# Patient Record
Sex: MALE | Race: WHITE | Employment: FULL TIME | URBAN - NONMETROPOLITAN AREA
[De-identification: names, ages, dates, MRNs, and addresses within clinical notes are randomized per-mention and may not be internally consistent; named-entity substitution may affect disease eponyms.]

---

## 2018-09-24 ENCOUNTER — HOSPITAL ENCOUNTER (OUTPATIENT)
Dept: ULTRASOUND IMAGING | Facility: HOSPITAL | Age: 38
Discharge: HOME | End: 2018-09-24
Attending: EMERGENCY MEDICINE | Admitting: EMERGENCY MEDICINE
Payer: COMMERCIAL

## 2018-09-24 DIAGNOSIS — R31.9 HEMATURIA: ICD-10-CM

## 2018-09-24 PROCEDURE — 76770 US EXAM ABDO BACK WALL COMP: CPT | Mod: TC

## 2019-04-18 ENCOUNTER — TRANSFERRED RECORDS (OUTPATIENT)
Dept: HEALTH INFORMATION MANAGEMENT | Facility: OTHER | Age: 39
End: 2019-04-18

## 2019-04-22 ENCOUNTER — TRANSFERRED RECORDS (OUTPATIENT)
Dept: HEALTH INFORMATION MANAGEMENT | Facility: OTHER | Age: 39
End: 2019-04-22

## 2019-04-24 ENCOUNTER — OFFICE VISIT (OUTPATIENT)
Dept: UROLOGY | Facility: OTHER | Age: 39
End: 2019-04-24
Attending: UROLOGY
Payer: COMMERCIAL

## 2019-04-24 VITALS
TEMPERATURE: 96.8 F | SYSTOLIC BLOOD PRESSURE: 122 MMHG | RESPIRATION RATE: 16 BRPM | OXYGEN SATURATION: 95 % | HEART RATE: 68 BPM | WEIGHT: 245 LBS | BODY MASS INDEX: 31.44 KG/M2 | HEIGHT: 74 IN | DIASTOLIC BLOOD PRESSURE: 64 MMHG

## 2019-04-24 DIAGNOSIS — N20.0 KIDNEY STONES: Primary | ICD-10-CM

## 2019-04-24 PROBLEM — I10 ESSENTIAL HYPERTENSION: Status: ACTIVE | Noted: 2018-06-29

## 2019-04-24 PROBLEM — E66.811 OBESITY (BMI 30.0-34.9): Status: ACTIVE | Noted: 2018-06-28

## 2019-04-24 PROCEDURE — 99204 OFFICE O/P NEW MOD 45 MIN: CPT | Performed by: UROLOGY

## 2019-04-24 RX ORDER — HYDROCODONE BITARTRATE AND ACETAMINOPHEN 5; 325 MG/1; MG/1
TABLET ORAL
Qty: 30 TABLET | Refills: 0 | Status: ON HOLD | OUTPATIENT
Start: 2019-04-24 | End: 2019-05-07

## 2019-04-24 RX ORDER — HYDROCODONE BITARTRATE AND ACETAMINOPHEN 5; 325 MG/1; MG/1
TABLET ORAL EVERY 4 HOURS PRN
Refills: 0 | Status: ON HOLD | COMMUNITY
Start: 2019-04-22 | End: 2019-05-07

## 2019-04-24 RX ORDER — LISINOPRIL 20 MG/1
TABLET ORAL DAILY
Refills: 3 | COMMUNITY
Start: 2019-04-22

## 2019-04-24 ASSESSMENT — MIFFLIN-ST. JEOR: SCORE: 2101.06

## 2019-04-24 ASSESSMENT — PAIN SCALES - GENERAL: PAINLEVEL: MODERATE PAIN (4)

## 2019-04-24 NOTE — PROGRESS NOTES
Type of Visit  NPV    Chief Complaint  Kidney stones    HPI  Mr. Calderón is a 39 year old male who presents with right UPJ stone and left kidney stones  The patient initially presented to the ED 6 days ago in [place].  At that time the patient underwent a CT scan which revealed a 11 mm obstructing stone.  The patient currently denies fevers or chills.  The patient currently denies nausea or vomiting.  The patient has not undergone surgery in the past for stones.    The patient was seen about 7 months ago for hematuria and underwent an ultrasound.  Stones were identified but they were not obstructive and painful.  He has not seen a urologist in the meantime.  Norco approximately 4 tablets a day has been managing his pain moderately well.  He has not been taking NSAIDs.    Pain ROS  Location:  Right flank  Quality:    Sharp and Dull  Provocative factors:  Nothing makes it worse  Palliative factors:   Narcotics make it better  Radiation:   Along right flank  Severity:   3/10 currently and 7-8/10 at its worst  Time:     Pain started 6 day(s) ago    For this visit I reviewed outside records including imaging and report.      Past Medical History  He  has no past medical history on file.  Patient Active Problem List   Diagnosis     Essential hypertension     Obesity (BMI 30.0-34.9)       Past Surgical History  He  has no past surgical history on file.    Medications  He has a current medication list which includes the following prescription(s): hydrocodone-acetaminophen and lisinopril.    Allergies  No Known Allergies    Social History  He  reports that he has been smoking.  He has never used smokeless tobacco. He reports that he drinks alcohol. He reports that he has current or past drug history.  No drug abuse.    Family History  History reviewed. No pertinent family history.    Review of Systems  I personally reviewed the ROS with the patient.    Nursing Notes:   Michelle Yap LPN  4/24/2019  1:13 PM  Signed  Chief  "Complaint   Patient presents with     Consult     [facility]       Initial /64   Pulse 68   Temp 96.8  F (36  C) (Tympanic)   Resp 16   Ht 1.88 m (6' 2\")   Wt 111.1 kg (245 lb)   SpO2 95%   BMI 31.46 kg/m    Estimated body mass index is 31.46 kg/m  as calculated from the following:    Height as of this encounter: 1.88 m (6' 2\").    Weight as of this encounter: 111.1 kg (245 lb).  Medication Reconciliation: complete   Review of Systems:    Weight loss:    No     Recent fever/chills:  yes   Night sweats:   No  Current skin rash:  No   Recent hair loss:  No  Heat intolerance:  No   Cold intolerance:  No  Chest pain:   No   Palpitations:   No  Shortness of breath:  No   Wheezing:   No  Constipation:    yes   Diarrhea:   No   Nausea:   yes   Vomiting:   yes   Kidney/side pain:  yes   Back pain:   No  Frequent headaches:  No   Dizziness:     No  Leg swelling:   No   Calf pain:    No    Parents, brothers or sisters with history of kidney cancer:   No  Parents, brothers or sisters with history of bladder cancer: No      Michelle Yap LPN      Physical Exam  Vitals:    04/24/19 1251   BP: 122/64   Pulse: 68   Resp: 16   Temp: 96.8  F (36  C)   TempSrc: Tympanic   SpO2: 95%   Weight: 111.1 kg (245 lb)   Height: 1.88 m (6' 2\")     Constitutional: No acute distress.  Alert and cooperative   Head: NCAT  Eyes: Conjunctivae normal  Cardiovascular: Regular rate and rhythm  Pulmonary/Chest: Respirations are even and non-labored bilaterally, no audible wheezing CTAB  Abdominal: Soft. No distension, tenderness, masses or guarding.   Neurological: A + O x 3.  Cranial Nerves II-XII grossly intact.  Extremities: ELMER x 4, Warm. No clubbing.  No cyanosis.    Skin: Pink, warm and dry.  No visible rashes noted.  Psychiatric:  Normal mood and affect  Back:  - left CVA tenderness.  + right CVA tenderness.  Genitourinary: nonpalpable bladder    Labs  Results for orders placed or performed during the " hospital encounter of 09/24/18   US Renal Complete    Narrative    PROCEDURE: US RENAL COMPLETE    HISTORY: Hematuria    TECHNIQUE:  A renal ultrasound was performed.    COMPARISON:  None.    MEASUREMENTS:    Right renal length: 12.7 cm  Left renal length: 13.6 cm    RENAL FINDINGS: The kidneys are normal in size and echogenicity. A 9  mm cyst is present on the right. An 8 mm shadowing stone is present in  the mid left kidney. Another 1.3 cm shadowing stone is present in the  slightly upper left kidney. A few dilated calyces are questioned in  the upper pole the left kidney.    BLADDER: The bladder is well distended and unremarkable.      Impression    IMPRESSION:      At least 2 left renal stones measuring up to 13 mm. Questionable  dilation of the upper pole calyces of the left kidney. Consider CT  urogram WWO for further assessment.      ERAN BAIRES MD       Imaging  CT stone study   4/18/2019  I personally reviewed and interpreted the images and report.  Bilateral kidney stones  Right sided obstructing 11 x 13  Left side nonobstructing 15mm    Assessment  Mr. Calderón is a 39 year old male who presents with symptomatic right  ureteral stone.    Discussed the treatment options for the right stone including ESWL and ureteroscopy with laser lithotripsy.  Stone is too big to pass.    After explaining the risks, benefits, and alternatives a decision was made to proceed with ureteroscopy/laser lithotripsy.    The patient was explained the specific risks of bleeding, pain, infection, and ureteral injury.    In addition, the patient was told a stent may need to be placed which could result in urinary frequency, urgency, dysuria, and flank pain with voiding.    It would require an office based procedure to remove it at a later date.    Finally, the patient was told if the stone was very impacted, that we would place a stent and return at a later date to treat the stone.      Plan  Norco #30  I also recommended he  "start taking ibuprofen 800 mg 3 times a day.  The patient was scheduled and consented for \"right ureteroscopy with holmium laser lithotripsy and stent placement\" in the OR (LMA general anesthesia).  Plan for [facility] after treatment       "

## 2019-04-24 NOTE — NURSING NOTE
"Chief Complaint   Patient presents with     Consult     [facility]       Initial /64   Pulse 68   Temp 96.8  F (36  C) (Tympanic)   Resp 16   Ht 1.88 m (6' 2\")   Wt 111.1 kg (245 lb)   SpO2 95%   BMI 31.46 kg/m   Estimated body mass index is 31.46 kg/m  as calculated from the following:    Height as of this encounter: 1.88 m (6' 2\").    Weight as of this encounter: 111.1 kg (245 lb).  Medication Reconciliation: complete   Review of Systems:    Weight loss:    No     Recent fever/chills:  yes   Night sweats:   No  Current skin rash:  No   Recent hair loss:  No  Heat intolerance:  No   Cold intolerance:  No  Chest pain:   No   Palpitations:   No  Shortness of breath:  No   Wheezing:   No  Constipation:    yes   Diarrhea:   No   Nausea:   yes   Vomiting:   yes   Kidney/side pain:  yes   Back pain:   No  Frequent headaches:  No   Dizziness:     No  Leg swelling:   No   Calf pain:    No    Parents, brothers or sisters with history of kidney cancer:   No  Parents, brothers or sisters with history of bladder cancer: No      Michelle Yap LPN    "

## 2019-04-30 ENCOUNTER — ANESTHESIA (OUTPATIENT)
Dept: SURGERY | Facility: OTHER | Age: 39
End: 2019-04-30
Payer: COMMERCIAL

## 2019-04-30 ENCOUNTER — HOSPITAL ENCOUNTER (OUTPATIENT)
Dept: GENERAL RADIOLOGY | Facility: OTHER | Age: 39
End: 2019-04-30
Attending: UROLOGY | Admitting: UROLOGY
Payer: COMMERCIAL

## 2019-04-30 ENCOUNTER — ANESTHESIA EVENT (OUTPATIENT)
Dept: SURGERY | Facility: OTHER | Age: 39
End: 2019-04-30
Payer: COMMERCIAL

## 2019-04-30 ENCOUNTER — HOSPITAL ENCOUNTER (OUTPATIENT)
Facility: OTHER | Age: 39
Discharge: HOME | End: 2019-04-30
Attending: UROLOGY | Admitting: UROLOGY
Payer: COMMERCIAL

## 2019-04-30 VITALS
DIASTOLIC BLOOD PRESSURE: 87 MMHG | SYSTOLIC BLOOD PRESSURE: 119 MMHG | TEMPERATURE: 97.4 F | HEART RATE: 62 BPM | OXYGEN SATURATION: 96 % | RESPIRATION RATE: 12 BRPM

## 2019-04-30 DIAGNOSIS — N20.0 BILATERAL KIDNEY STONES: Primary | ICD-10-CM

## 2019-04-30 DIAGNOSIS — N20.0 KIDNEY STONE ON RIGHT SIDE: ICD-10-CM

## 2019-04-30 PROCEDURE — 25800030 ZZH RX IP 258 OP 636: Performed by: NURSE ANESTHETIST, CERTIFIED REGISTERED

## 2019-04-30 PROCEDURE — 27210794 ZZH OR GENERAL SUPPLY STERILE: Performed by: UROLOGY

## 2019-04-30 PROCEDURE — C1758 CATHETER, URETERAL: HCPCS | Performed by: UROLOGY

## 2019-04-30 PROCEDURE — 40000306 ZZH STATISTIC PRE PROC ASSESS II: Performed by: UROLOGY

## 2019-04-30 PROCEDURE — 27211024 ZZHC OR SUPPLY OTHER OPNP: Performed by: UROLOGY

## 2019-04-30 PROCEDURE — 25500064 ZZH RX 255 OP 636: Performed by: UROLOGY

## 2019-04-30 PROCEDURE — 25000128 H RX IP 250 OP 636: Performed by: NURSE ANESTHETIST, CERTIFIED REGISTERED

## 2019-04-30 PROCEDURE — 71000027 ZZH RECOVERY PHASE 2 EACH 15 MINS: Performed by: UROLOGY

## 2019-04-30 PROCEDURE — 71000014 ZZH RECOVERY PHASE 1 LEVEL 2 FIRST HR: Performed by: UROLOGY

## 2019-04-30 PROCEDURE — 25800025 ZZH RX 258: Performed by: UROLOGY

## 2019-04-30 PROCEDURE — C1769 GUIDE WIRE: HCPCS | Performed by: UROLOGY

## 2019-04-30 PROCEDURE — 37000008 ZZH ANESTHESIA TECHNICAL FEE, 1ST 30 MIN: Performed by: UROLOGY

## 2019-04-30 PROCEDURE — 74420 UROGRAPHY RTRGR +-KUB: CPT | Mod: 26 | Performed by: UROLOGY

## 2019-04-30 PROCEDURE — C1894 INTRO/SHEATH, NON-LASER: HCPCS | Performed by: UROLOGY

## 2019-04-30 PROCEDURE — C2617 STENT, NON-COR, TEM W/O DEL: HCPCS | Performed by: UROLOGY

## 2019-04-30 PROCEDURE — 74420 UROGRAPHY RTRGR +-KUB: CPT

## 2019-04-30 PROCEDURE — 25000125 ZZHC RX 250: Performed by: NURSE ANESTHETIST, CERTIFIED REGISTERED

## 2019-04-30 PROCEDURE — 52356 CYSTO/URETERO W/LITHOTRIPSY: CPT | Performed by: NURSE ANESTHETIST, CERTIFIED REGISTERED

## 2019-04-30 PROCEDURE — 52356 CYSTO/URETERO W/LITHOTRIPSY: CPT | Mod: 50 | Performed by: UROLOGY

## 2019-04-30 PROCEDURE — 36000058 ZZH SURGERY LEVEL 3 EA 15 ADDTL MIN: Performed by: UROLOGY

## 2019-04-30 PROCEDURE — 82365 CALCULUS SPECTROSCOPY: CPT | Performed by: UROLOGY

## 2019-04-30 PROCEDURE — 37000009 ZZH ANESTHESIA TECHNICAL FEE, EACH ADDTL 15 MIN: Performed by: UROLOGY

## 2019-04-30 PROCEDURE — 36000060 ZZH SURGERY LEVEL 3 W FLUORO 1ST 30 MIN: Performed by: UROLOGY

## 2019-04-30 PROCEDURE — 40000278 XR SURGERY CARM FLUORO LESS THAN 5 MIN

## 2019-04-30 PROCEDURE — 76499 UNLISTED DX RADIOGRAPHIC PX: CPT

## 2019-04-30 PROCEDURE — 25000128 H RX IP 250 OP 636: Performed by: UROLOGY

## 2019-04-30 DEVICE — STENT URETERAL CONTOUR SOFT PERCUFLEX 6FRX28CM
Type: IMPLANTABLE DEVICE | Site: URETER | Status: NON-FUNCTIONAL
Removed: 2019-05-07

## 2019-04-30 RX ORDER — OXYCODONE AND ACETAMINOPHEN 5; 325 MG/1; MG/1
1-2 TABLET ORAL EVERY 6 HOURS PRN
Qty: 30 TABLET | Refills: 0 | Status: ON HOLD | OUTPATIENT
Start: 2019-04-30 | End: 2019-05-07

## 2019-04-30 RX ORDER — HYDROMORPHONE HYDROCHLORIDE 1 MG/ML
.3-.5 INJECTION, SOLUTION INTRAMUSCULAR; INTRAVENOUS; SUBCUTANEOUS EVERY 10 MIN PRN
Status: DISCONTINUED | OUTPATIENT
Start: 2019-04-30 | End: 2019-04-30 | Stop reason: HOSPADM

## 2019-04-30 RX ORDER — FENTANYL CITRATE 50 UG/ML
25 INJECTION, SOLUTION INTRAMUSCULAR; INTRAVENOUS
Status: DISCONTINUED | OUTPATIENT
Start: 2019-04-30 | End: 2019-04-30 | Stop reason: HOSPADM

## 2019-04-30 RX ORDER — FENTANYL CITRATE 50 UG/ML
25-50 INJECTION, SOLUTION INTRAMUSCULAR; INTRAVENOUS
Status: DISCONTINUED | OUTPATIENT
Start: 2019-04-30 | End: 2019-04-30 | Stop reason: HOSPADM

## 2019-04-30 RX ORDER — PROPOFOL 10 MG/ML
INJECTION, EMULSION INTRAVENOUS PRN
Status: DISCONTINUED | OUTPATIENT
Start: 2019-04-30 | End: 2019-04-30

## 2019-04-30 RX ORDER — OXYCODONE AND ACETAMINOPHEN 5; 325 MG/1; MG/1
1-2 TABLET ORAL EVERY 4 HOURS PRN
Status: DISCONTINUED | OUTPATIENT
Start: 2019-04-30 | End: 2019-04-30 | Stop reason: HOSPADM

## 2019-04-30 RX ORDER — ACETAMINOPHEN 10 MG/ML
INJECTION, SOLUTION INTRAVENOUS PRN
Status: DISCONTINUED | OUTPATIENT
Start: 2019-04-30 | End: 2019-04-30

## 2019-04-30 RX ORDER — NALOXONE HYDROCHLORIDE 0.4 MG/ML
.1-.4 INJECTION, SOLUTION INTRAMUSCULAR; INTRAVENOUS; SUBCUTANEOUS
Status: DISCONTINUED | OUTPATIENT
Start: 2019-04-30 | End: 2019-04-30 | Stop reason: HOSPADM

## 2019-04-30 RX ORDER — KETAMINE HYDROCHLORIDE 10 MG/ML
INJECTION INTRAMUSCULAR; INTRAVENOUS PRN
Status: DISCONTINUED | OUTPATIENT
Start: 2019-04-30 | End: 2019-04-30

## 2019-04-30 RX ORDER — ONDANSETRON 4 MG/1
4 TABLET, ORALLY DISINTEGRATING ORAL EVERY 30 MIN PRN
Status: DISCONTINUED | OUTPATIENT
Start: 2019-04-30 | End: 2019-04-30 | Stop reason: HOSPADM

## 2019-04-30 RX ORDER — DEXAMETHASONE SODIUM PHOSPHATE 4 MG/ML
INJECTION, SOLUTION INTRA-ARTICULAR; INTRALESIONAL; INTRAMUSCULAR; INTRAVENOUS; SOFT TISSUE PRN
Status: DISCONTINUED | OUTPATIENT
Start: 2019-04-30 | End: 2019-04-30

## 2019-04-30 RX ORDER — ONDANSETRON 2 MG/ML
4 INJECTION INTRAMUSCULAR; INTRAVENOUS EVERY 30 MIN PRN
Status: DISCONTINUED | OUTPATIENT
Start: 2019-04-30 | End: 2019-04-30 | Stop reason: HOSPADM

## 2019-04-30 RX ORDER — PROPOFOL 10 MG/ML
INJECTION, EMULSION INTRAVENOUS CONTINUOUS PRN
Status: DISCONTINUED | OUTPATIENT
Start: 2019-04-30 | End: 2019-04-30

## 2019-04-30 RX ORDER — SODIUM CHLORIDE 9 MG/ML
INJECTION, SOLUTION INTRAVENOUS CONTINUOUS
Status: DISCONTINUED | OUTPATIENT
Start: 2019-04-30 | End: 2019-04-30 | Stop reason: HOSPADM

## 2019-04-30 RX ORDER — FENTANYL CITRATE 50 UG/ML
INJECTION, SOLUTION INTRAMUSCULAR; INTRAVENOUS PRN
Status: DISCONTINUED | OUTPATIENT
Start: 2019-04-30 | End: 2019-04-30

## 2019-04-30 RX ORDER — KETOROLAC TROMETHAMINE 30 MG/ML
INJECTION, SOLUTION INTRAMUSCULAR; INTRAVENOUS PRN
Status: DISCONTINUED | OUTPATIENT
Start: 2019-04-30 | End: 2019-04-30

## 2019-04-30 RX ORDER — CEFTRIAXONE SODIUM 1 G/50ML
1 INJECTION, SOLUTION INTRAVENOUS
Status: COMPLETED | OUTPATIENT
Start: 2019-04-30 | End: 2019-04-30

## 2019-04-30 RX ORDER — MEPERIDINE HYDROCHLORIDE 50 MG/ML
12.5 INJECTION INTRAMUSCULAR; INTRAVENOUS; SUBCUTANEOUS
Status: DISCONTINUED | OUTPATIENT
Start: 2019-04-30 | End: 2019-04-30 | Stop reason: HOSPADM

## 2019-04-30 RX ORDER — LIDOCAINE HYDROCHLORIDE 20 MG/ML
INJECTION, SOLUTION INFILTRATION; PERINEURAL PRN
Status: DISCONTINUED | OUTPATIENT
Start: 2019-04-30 | End: 2019-04-30

## 2019-04-30 RX ORDER — LIDOCAINE 40 MG/G
CREAM TOPICAL
Status: DISCONTINUED | OUTPATIENT
Start: 2019-04-30 | End: 2019-04-30 | Stop reason: HOSPADM

## 2019-04-30 RX ADMIN — CEFTRIAXONE SODIUM 1 G: 1 INJECTION, SOLUTION INTRAVENOUS at 11:06

## 2019-04-30 RX ADMIN — SODIUM CHLORIDE: 9 INJECTION, SOLUTION INTRAVENOUS at 12:37

## 2019-04-30 RX ADMIN — FENTANYL CITRATE 25 MCG: 50 INJECTION, SOLUTION INTRAMUSCULAR; INTRAVENOUS at 12:44

## 2019-04-30 RX ADMIN — Medication 30 MG: at 11:28

## 2019-04-30 RX ADMIN — MIDAZOLAM 2 MG: 1 INJECTION INTRAMUSCULAR; INTRAVENOUS at 11:06

## 2019-04-30 RX ADMIN — SODIUM CHLORIDE: 9 INJECTION, SOLUTION INTRAVENOUS at 09:39

## 2019-04-30 RX ADMIN — ONDANSETRON 4 MG: 2 INJECTION INTRAMUSCULAR; INTRAVENOUS at 11:12

## 2019-04-30 RX ADMIN — DEXAMETHASONE SODIUM PHOSPHATE 8 MG: 4 INJECTION, SOLUTION INTRA-ARTICULAR; INTRALESIONAL; INTRAMUSCULAR; INTRAVENOUS; SOFT TISSUE at 11:37

## 2019-04-30 RX ADMIN — ACETAMINOPHEN 1000 MG: 10 INJECTION, SOLUTION INTRAVENOUS at 11:32

## 2019-04-30 RX ADMIN — PROPOFOL 200 MG: 10 INJECTION, EMULSION INTRAVENOUS at 11:12

## 2019-04-30 RX ADMIN — LIDOCAINE HYDROCHLORIDE 80 MG: 20 INJECTION, SOLUTION INFILTRATION; PERINEURAL at 11:12

## 2019-04-30 RX ADMIN — LIDOCAINE HYDROCHLORIDE 0.1 ML: 10 INJECTION, SOLUTION EPIDURAL; INFILTRATION; INTRACAUDAL; PERINEURAL at 09:39

## 2019-04-30 RX ADMIN — FENTANYL CITRATE 50 MCG: 50 INJECTION, SOLUTION INTRAMUSCULAR; INTRAVENOUS at 12:58

## 2019-04-30 RX ADMIN — FENTANYL CITRATE 25 MCG: 50 INJECTION, SOLUTION INTRAMUSCULAR; INTRAVENOUS at 12:51

## 2019-04-30 RX ADMIN — KETOROLAC TROMETHAMINE 30 MG: 30 INJECTION, SOLUTION INTRAMUSCULAR at 12:46

## 2019-04-30 RX ADMIN — PROPOFOL 200 MCG/KG/MIN: 10 INJECTION, EMULSION INTRAVENOUS at 11:13

## 2019-04-30 ASSESSMENT — LIFESTYLE VARIABLES: TOBACCO_USE: 1

## 2019-04-30 NOTE — OR NURSING
Discharge instructions given to patient and patient's friend. No questions. Ambulated out of unit. Denies pain, nausea or dizziness

## 2019-04-30 NOTE — OP NOTE
Preoperative diagnosis  Bilateral kidney stone    Postoperative diagnosis  Bilateral kidney stone    Procedure performed  Bilateral ureteroscopy with holmium-YAG laser lithotripsy and basket extraction of stone fragments  Cystoscopy with bilateral retrograde pyelogram and ureteral stent placement  Interpretation of retrograde    Surgeon  Pablo Tejada MD    Surgeon(s)/Proceduralist(s) and Assistants (if any)  Surgeon(s):  Pablo Tejada MD  Circulator: Miryam Ivy RN  : Eloina Germain Circulator: France Croft RN  Relief Scrub: Marianna Brown  X-Ray Technologist: Shirley Laura  First Assistant: Viridiana Hunt RN    Specimen(s)  Stone analysis?  Yes    (EBL) Estimated blood loss (ml)  5    Anesthesia  General    Complications  None    Findings  Urethroscopy revealed no strictures or other abnormalities.  Cystoscopy revealed no tumors, stones or other mucosal abnormalities.    Right  retrograde pyelogram revealed a delicate system with identification of the stone seen on pre-op imaging.  No other filling defects and caliber of ureter was smooth and normal.    Left retrograde pyelogram revealed a delicate system with identification of the stone seen on pre-op imaging.  No other filling defects and caliber of ureter was smooth and normal.    Indications  39 year old male agreed to undergo the above named procedure after discussion of the alternatives, risks and benefits.  Informed consent was obtained.      Procedure  The patient was taken to the operating room and placed supine on the operating table.  Pre-operative antibiotics were administered.  Bilateral lower extremity SCDs were placed.  After induction of general anesthesia the patient was positioned in dorsal lithotomy, prepped and draped in a sterile fashion.  A time-out was performed.      A 14-Georgian flexible cystoscope was passed carefully via urethra into the bladder.  The right ureteral orifice was identified and a  Sensor wire was passed retrograde to the level of the kidney and confirmed by fluoroscopy.  The flexible scope was off-loaded and the bladder emptied with a straight catheter.  A 5-Libyan open-ended was passed over the wire and the wire removed.  A retrograde pyelogram was performed by slowly injecting 5 mL of 50% Omnipaque contrast via the 5-Libyan catheter with findings described above.  A sensor wire was replaced and the 5-Libyan removed. An 8-10 coaxial dilator was passed without difficulty.  The 8-Libyan portion was removed and an Amplatz super-stiff was placed to the level of the renal pelvis confirmed by fluoroscopy. The 10-Libyan dilator was then withdrawn. The Sensor wire was clipped to the drape as a safety wire.  A 12-14, 46-cm access sheath was advanced over the super-stiff wire to level of the UPJ under direct fluoroscopic guidance. The inner stylet and super-stiff wire were removed.  The kidney was entered with the Olympus URF-P6 flexible ureteroscope.  The kidney stone was identified and fragmented with a 200-micron holmium YAG laser fiber at laser settings of 0.8 joules and a frequency of 8 Hz. The fragments were basket extracted. At the completion of the procedure, all clinically significant fragments were removed and only dust-like fragments remained.  The access sheath was removed under direct vision.  Ureteral edema but no obvious obstruction was present.  A 5-Libyan catheter was passed over the safety wire and the wire withdrawn.   Contrast was injected into the renal pelvis via the 5-Libyan open-ended catheter.  A super-stiff wire was placed and confirmed by fluoroscopy.  A 6 x 28 Libyan stent was positioned with the upper end in the upper pole and the lower in the bladder confirmed by fluoroscopy.    A 14-Libyan flexible cystoscope was passed carefully via urethra into the bladder.  The left ureteral orifice was identified and a Sensor wire was passed retrograde to the level of the kidney and  confirmed by fluoroscopy.  The flexible scope was off-loaded and the bladder emptied with a straight catheter.  A 5-Vatican citizen open-ended was passed over the wire and the wire removed.  A retrograde pyelogram was performed by slowly injecting 5 mL of 50% Omnipaque contrast via the 5-Vatican citizen catheter with findings described above.  A sensor wire was replaced and the 5-Vatican citizen removed. An 8-10 coaxial dilator was passed without difficulty.  The 8-Vatican citizen portion was removed and an Amplatz super-stiff was placed to the level of the renal pelvis confirmed by fluoroscopy. The 10-Vatican citizen dilator was then withdrawn. The Sensor wire was clipped to the drape as a safety wire.  A 12-14, 46-cm access sheath was advanced over the super-stiff wire to level of the UPJ under direct fluoroscopic guidance. The inner stylet and super-stiff wire were removed.  The kidney was entered with the Olympus URF-P6 flexible ureteroscope.  The kidney stone was identified and fragmented with a 200-micron holmium YAG laser fiber at laser settings of 0.8 joules and a frequency of 8 Hz. The fragments were basket extracted. The access sheath was removed under direct vision.  Ureteral edema but no obvious obstruction was present.  A 5-Vatican citizen catheter was passed over the safety wire and the wire withdrawn.   Contrast was injected into the renal pelvis via the 5-V[place] citizen open-ended catheter.  A super-stiff wire was placed and confirmed by fluoroscopy.  A 6 x 30 Vatican citizen stent was positioned with the upper end in the upper pole and the lower in the bladder confirmed by fluoroscopy. The bladder was emptied and the procedure was complete. The patient tolerated the procedure well and was stable throughout.    Plan  Follow up in 1 week in the OR for left URS.  I will plan to acquire a 3 month post-op ultrasound

## 2019-04-30 NOTE — OR NURSING
PACU Respiratory Event Documentation     1) Episodes of Apnea greater than or equal to 10 seconds: no    2) Bradypnea - less than 8 breaths per minute: no    3) Pain score on 0 to 10 scale: 0    4) Pain-sedation mismatch (yes or no): no    5) Repeated 02 desaturation less than 90% (yes or no): no    Anesthesia notified? (yes or no): no    Any of the above events occuring repeatedly in separate 30 minute intervals may be considered recurrent PACU respiratory events.    Teresa Winter RN

## 2019-04-30 NOTE — ANESTHESIA POSTPROCEDURE EVALUATION
Patient: David Calderón    Procedure(s):  Bilateral Ureteroscopy with Holmium Laser Lithotripsy & Stent Placement    Diagnosis:kidney/Ureteral stone  Diagnosis Additional Information: No value filed.    Anesthesia Type:  General, LMA    Note:  Anesthesia Post Evaluation    Patient location during evaluation: Bedside  Patient participation: Able to fully participate in evaluation  Level of consciousness: awake and alert  Pain management: adequate  Airway patency: patent  Cardiovascular status: acceptable  Respiratory status: acceptable  Hydration status: acceptable  PONV: none     Anesthetic complications: None          Last vitals:  Vitals:    04/30/19 1345 04/30/19 1400 04/30/19 1415   BP: 117/82 123/82 119/87   Pulse:  59 62   Resp: 12 11 12   Temp:      SpO2: 95% 96% 96%         Electronically Signed By: THERESA Jefferson CRNA  April 30, 2019  3:18 PM

## 2019-04-30 NOTE — DISCHARGE INSTRUCTIONS
[facility]  Adult Discharge Orders & Instructions      For 24 hours after surgery:  1. Get plenty of rest.  A responsible adult must stay with you for at least 24 hours after you leave the hospital.   2. You may feel lightheaded.  IF so, sit for a few minutes before standing.  Have someone help you get up.   3. You may have a slight fever. Call the doctor if your fever is over 101 F (38.3 C) (taken under the tongue) or lasts longer than 24 hours.  4. You may have a dry mouth, a sore throat, muscle aches or trouble sleeping.  These should go away after 24 hours.  5. Do not make important or legal decisions.  6.   Do not drive or use heavy equipment.  If you have weakness or tingling, don't drive or use heavy equipment until this feeling goes away.                                                                                                                                                                         To contact a doctor, call    [phone]______________

## 2019-04-30 NOTE — ANESTHESIA PREPROCEDURE EVALUATION
Anesthesia Pre-Procedure Evaluation    Patient: David Calderón   MRN: [id number] : 1980          Preoperative Diagnosis: kidney/Ureteral stone    Procedure(s):  Right Ureteroscopy with Holmium Laser Lithotripsy & Stent Placement    History reviewed. No pertinent past medical history.  History reviewed. No pertinent surgical history.    Anesthesia Evaluation     . Pt has had prior anesthetic.     No history of anesthetic complications          ROS/MED HX    ENT/Pulmonary:     (+)tobacco use, Current use 1 packs/day  , . .    Neurologic:  - neg neurologic ROS     Cardiovascular:     (+) hypertension----. : . . . :. .       METS/Exercise Tolerance:  >4 METS   Hematologic:  - neg hematologic  ROS       Musculoskeletal:  - neg musculoskeletal ROS       GI/Hepatic:  - neg GI/hepatic ROS       Renal/Genitourinary:  - ROS Renal section negative       Endo:  - neg endo ROS       Psychiatric:  - neg psychiatric ROS       Infectious Disease:  - neg infectious disease ROS       Malignancy:      - no malignancy   Other:    (+) No chance of pregnancy C-spine cleared: N/A, no H/O Chronic Pain,no other significant disability                         Physical Exam  Normal systems: cardiovascular and pulmonary    Airway   Mallampati: II  TM distance: >3 FB  Neck ROM: full  Comment: Full beard    Dental   (+) upper dentures and lower dentures    Cardiovascular       Pulmonary             No results found for: WBC, HGB, HCT, PLT, CRP, SED, NA, POTASSIUM, CHLORIDE, CO2, BUN, CR, GLC, JEFFREY, PHOS, MAG, ALBUMIN, PROTTOTAL, ALT, AST, GGT, ALKPHOS, BILITOTAL, BILIDIRECT, LIPASE, AMYLASE, SIENNA, PTT, INR, FIBR, TSH, T4, T3, HCG, HCGS, CKTOTAL, CKMB, TROPN    Preop Vitals  BP Readings from Last 3 Encounters:   19 (!) 154/91   19 122/64    Pulse Readings from Last 3 Encounters:   19 68      Resp Readings from Last 3 Encounters:   19 16   19 16    SpO2 Readings from Last 3 Encounters:   19 95%   19  "95%      Temp Readings from Last 1 Encounters:   04/30/19 97.1  F (36.2  C) (Tympanic)    Ht Readings from Last 1 Encounters:   04/24/19 1.88 m (6' 2\")      Wt Readings from Last 1 Encounters:   04/24/19 111.1 kg (245 lb)    Estimated body mass index is 31.46 kg/m  as calculated from the following:    Height as of 4/24/19: 1.88 m (6' 2\").    Weight as of 4/24/19: 111.1 kg (245 lb).       Anesthesia Plan      History & Physical Review      ASA Status:  2 .    NPO Status:  > 8 hours    Plan for General and LMA with Intravenous induction. Maintenance will be Balanced.    PONV prophylaxis:  Ondansetron (or other 5HT-3) and Dexamethasone or Solumedrol       Postoperative Care  Postoperative pain management:  IV analgesics.      Consents  Anesthetic plan, risks, benefits and alternatives discussed with:  Patient.  Use of blood products discussed: No .   .                 THERESA Jefferson CRNA  "

## 2019-04-30 NOTE — ANESTHESIA CARE TRANSFER NOTE
Patient: David Calderón    Procedure(s):  Bilateral Ureteroscopy with Holmium Laser Lithotripsy & Stent Placement    Diagnosis: kidney/Ureteral stone  Diagnosis Additional Information: No value filed.    Anesthesia Type:   General, LMA     Note:  Airway :Face Mask  Patient transferred to:PACU  Handoff Report: Identifed the Patient, Identified the Reponsible Provider, Reviewed the pertinent medical history, Discussed the surgical course, Reviewed Intra-OP anesthesia mangement and issues during anesthesia, Set expectations for post-procedure period and Allowed opportunity for questions and acknowledgement of understanding      Vitals: (Last set prior to Anesthesia Care Transfer)    CRNA VITALS  4/30/2019 1232 - 4/30/2019 1305      4/30/2019             Resp Rate (observed):  2  (Abnormal)     Resp Rate (set):  10                Electronically Signed By: THERESA MALONE CRNA  April 30, 2019  1:05 PM

## 2019-05-03 LAB
APPEARANCE STONE: NORMAL
COMPN STONE: NORMAL
NUMBER STONE: NORMAL
SIZE STONE: NORMAL MM
WT STONE: 580 MG

## 2019-05-06 ENCOUNTER — ANESTHESIA EVENT (OUTPATIENT)
Dept: SURGERY | Facility: OTHER | Age: 39
End: 2019-05-06
Payer: COMMERCIAL

## 2019-05-07 ENCOUNTER — ANESTHESIA (OUTPATIENT)
Dept: SURGERY | Facility: OTHER | Age: 39
End: 2019-05-07
Payer: COMMERCIAL

## 2019-05-07 ENCOUNTER — HOSPITAL ENCOUNTER (OUTPATIENT)
Facility: OTHER | Age: 39
Discharge: HOME | End: 2019-05-07
Attending: UROLOGY | Admitting: UROLOGY
Payer: COMMERCIAL

## 2019-05-07 ENCOUNTER — HOSPITAL ENCOUNTER (OUTPATIENT)
Dept: GENERAL RADIOLOGY | Facility: OTHER | Age: 39
End: 2019-05-07
Attending: UROLOGY

## 2019-05-07 VITALS
WEIGHT: 245 LBS | HEART RATE: 70 BPM | BODY MASS INDEX: 31.46 KG/M2 | SYSTOLIC BLOOD PRESSURE: 117 MMHG | RESPIRATION RATE: 18 BRPM | TEMPERATURE: 98.2 F | DIASTOLIC BLOOD PRESSURE: 75 MMHG | OXYGEN SATURATION: 95 %

## 2019-05-07 DIAGNOSIS — N20.0 KIDNEY STONE: ICD-10-CM

## 2019-05-07 DIAGNOSIS — N20.0 KIDNEY STONE: Primary | ICD-10-CM

## 2019-05-07 PROCEDURE — C1894 INTRO/SHEATH, NON-LASER: HCPCS | Performed by: UROLOGY

## 2019-05-07 PROCEDURE — 74420 UROGRAPHY RTRGR +-KUB: CPT

## 2019-05-07 PROCEDURE — 71000027 ZZH RECOVERY PHASE 2 EACH 15 MINS: Performed by: UROLOGY

## 2019-05-07 PROCEDURE — 25500064 ZZH RX 255 OP 636: Performed by: UROLOGY

## 2019-05-07 PROCEDURE — 36000060 ZZH SURGERY LEVEL 3 W FLUORO 1ST 30 MIN: Performed by: UROLOGY

## 2019-05-07 PROCEDURE — 52356 CYSTO/URETERO W/LITHOTRIPSY: CPT | Mod: 50 | Performed by: UROLOGY

## 2019-05-07 PROCEDURE — 25800030 ZZH RX IP 258 OP 636: Performed by: NURSE ANESTHETIST, CERTIFIED REGISTERED

## 2019-05-07 PROCEDURE — 27810325 ZZHC OR IMPLANT OTHER OPNP: Performed by: UROLOGY

## 2019-05-07 PROCEDURE — 25000564 ZZH DESFLURANE, EA 15 MIN: Performed by: UROLOGY

## 2019-05-07 PROCEDURE — 76499 UNLISTED DX RADIOGRAPHIC PX: CPT

## 2019-05-07 PROCEDURE — 37000008 ZZH ANESTHESIA TECHNICAL FEE, 1ST 30 MIN: Performed by: UROLOGY

## 2019-05-07 PROCEDURE — 37000009 ZZH ANESTHESIA TECHNICAL FEE, EACH ADDTL 15 MIN: Performed by: UROLOGY

## 2019-05-07 PROCEDURE — 25000128 H RX IP 250 OP 636: Performed by: NURSE ANESTHETIST, CERTIFIED REGISTERED

## 2019-05-07 PROCEDURE — C1758 CATHETER, URETERAL: HCPCS | Performed by: UROLOGY

## 2019-05-07 PROCEDURE — 25000128 H RX IP 250 OP 636: Performed by: UROLOGY

## 2019-05-07 PROCEDURE — C1769 GUIDE WIRE: HCPCS | Performed by: UROLOGY

## 2019-05-07 PROCEDURE — 40000306 ZZH STATISTIC PRE PROC ASSESS II: Performed by: UROLOGY

## 2019-05-07 PROCEDURE — 25800025 ZZH RX 258: Performed by: UROLOGY

## 2019-05-07 PROCEDURE — 36000058 ZZH SURGERY LEVEL 3 EA 15 ADDTL MIN: Performed by: UROLOGY

## 2019-05-07 PROCEDURE — 74420 UROGRAPHY RTRGR +-KUB: CPT | Mod: 26 | Performed by: UROLOGY

## 2019-05-07 PROCEDURE — 25000125 ZZHC RX 250: Performed by: NURSE ANESTHETIST, CERTIFIED REGISTERED

## 2019-05-07 PROCEDURE — 27210794 ZZH OR GENERAL SUPPLY STERILE: Performed by: UROLOGY

## 2019-05-07 PROCEDURE — 71000014 ZZH RECOVERY PHASE 1 LEVEL 2 FIRST HR: Performed by: UROLOGY

## 2019-05-07 PROCEDURE — 52356 CYSTO/URETERO W/LITHOTRIPSY: CPT | Performed by: NURSE ANESTHETIST, CERTIFIED REGISTERED

## 2019-05-07 DEVICE — IMPLANTABLE DEVICE: Type: IMPLANTABLE DEVICE | Site: URETER | Status: FUNCTIONAL

## 2019-05-07 RX ORDER — FENTANYL CITRATE 50 UG/ML
INJECTION, SOLUTION INTRAMUSCULAR; INTRAVENOUS PRN
Status: DISCONTINUED | OUTPATIENT
Start: 2019-05-07 | End: 2019-05-07

## 2019-05-07 RX ORDER — SODIUM CHLORIDE 9 MG/ML
INJECTION, SOLUTION INTRAVENOUS CONTINUOUS
Status: DISCONTINUED | OUTPATIENT
Start: 2019-05-07 | End: 2019-05-07 | Stop reason: HOSPADM

## 2019-05-07 RX ORDER — DEXAMETHASONE SODIUM PHOSPHATE 4 MG/ML
INJECTION, SOLUTION INTRA-ARTICULAR; INTRALESIONAL; INTRAMUSCULAR; INTRAVENOUS; SOFT TISSUE PRN
Status: DISCONTINUED | OUTPATIENT
Start: 2019-05-07 | End: 2019-05-07

## 2019-05-07 RX ORDER — MEPERIDINE HYDROCHLORIDE 50 MG/ML
12.5 INJECTION INTRAMUSCULAR; INTRAVENOUS; SUBCUTANEOUS
Status: DISCONTINUED | OUTPATIENT
Start: 2019-05-07 | End: 2019-05-07 | Stop reason: HOSPADM

## 2019-05-07 RX ORDER — ONDANSETRON 2 MG/ML
4 INJECTION INTRAMUSCULAR; INTRAVENOUS EVERY 30 MIN PRN
Status: DISCONTINUED | OUTPATIENT
Start: 2019-05-07 | End: 2019-05-07 | Stop reason: HOSPADM

## 2019-05-07 RX ORDER — FENTANYL CITRATE 50 UG/ML
25-50 INJECTION, SOLUTION INTRAMUSCULAR; INTRAVENOUS EVERY 5 MIN PRN
Status: DISCONTINUED | OUTPATIENT
Start: 2019-05-07 | End: 2019-05-07 | Stop reason: HOSPADM

## 2019-05-07 RX ORDER — ONDANSETRON 4 MG/1
4 TABLET, ORALLY DISINTEGRATING ORAL EVERY 30 MIN PRN
Status: DISCONTINUED | OUTPATIENT
Start: 2019-05-07 | End: 2019-05-07 | Stop reason: HOSPADM

## 2019-05-07 RX ORDER — KETOROLAC TROMETHAMINE 30 MG/ML
INJECTION, SOLUTION INTRAMUSCULAR; INTRAVENOUS PRN
Status: DISCONTINUED | OUTPATIENT
Start: 2019-05-07 | End: 2019-05-07

## 2019-05-07 RX ORDER — NALOXONE HYDROCHLORIDE 0.4 MG/ML
.1-.4 INJECTION, SOLUTION INTRAMUSCULAR; INTRAVENOUS; SUBCUTANEOUS
Status: DISCONTINUED | OUTPATIENT
Start: 2019-05-07 | End: 2019-05-07 | Stop reason: HOSPADM

## 2019-05-07 RX ORDER — LIDOCAINE HYDROCHLORIDE 20 MG/ML
INJECTION, SOLUTION INFILTRATION; PERINEURAL PRN
Status: DISCONTINUED | OUTPATIENT
Start: 2019-05-07 | End: 2019-05-07

## 2019-05-07 RX ORDER — OXYCODONE AND ACETAMINOPHEN 5; 325 MG/1; MG/1
1-2 TABLET ORAL EVERY 4 HOURS PRN
Status: DISCONTINUED | OUTPATIENT
Start: 2019-05-07 | End: 2019-05-07 | Stop reason: HOSPADM

## 2019-05-07 RX ORDER — CEFTRIAXONE SODIUM 1 G/50ML
1 INJECTION, SOLUTION INTRAVENOUS
Status: COMPLETED | OUTPATIENT
Start: 2019-05-07 | End: 2019-05-07

## 2019-05-07 RX ORDER — HYDROMORPHONE HYDROCHLORIDE 1 MG/ML
.3-.5 INJECTION, SOLUTION INTRAMUSCULAR; INTRAVENOUS; SUBCUTANEOUS EVERY 10 MIN PRN
Status: DISCONTINUED | OUTPATIENT
Start: 2019-05-07 | End: 2019-05-07 | Stop reason: HOSPADM

## 2019-05-07 RX ORDER — SODIUM CHLORIDE, SODIUM LACTATE, POTASSIUM CHLORIDE, CALCIUM CHLORIDE 600; 310; 30; 20 MG/100ML; MG/100ML; MG/100ML; MG/100ML
INJECTION, SOLUTION INTRAVENOUS CONTINUOUS PRN
Status: DISCONTINUED | OUTPATIENT
Start: 2019-05-07 | End: 2019-05-07

## 2019-05-07 RX ORDER — PROPOFOL 10 MG/ML
INJECTION, EMULSION INTRAVENOUS PRN
Status: DISCONTINUED | OUTPATIENT
Start: 2019-05-07 | End: 2019-05-07

## 2019-05-07 RX ORDER — OXYCODONE AND ACETAMINOPHEN 5; 325 MG/1; MG/1
1-2 TABLET ORAL EVERY 6 HOURS PRN
Qty: 20 TABLET | Refills: 0 | Status: SHIPPED | OUTPATIENT
Start: 2019-05-07

## 2019-05-07 RX ORDER — ACETAMINOPHEN 10 MG/ML
INJECTION, SOLUTION INTRAVENOUS PRN
Status: DISCONTINUED | OUTPATIENT
Start: 2019-05-07 | End: 2019-05-07

## 2019-05-07 RX ORDER — ONDANSETRON 2 MG/ML
INJECTION INTRAMUSCULAR; INTRAVENOUS PRN
Status: DISCONTINUED | OUTPATIENT
Start: 2019-05-07 | End: 2019-05-07

## 2019-05-07 RX ORDER — KETAMINE HYDROCHLORIDE 10 MG/ML
INJECTION INTRAMUSCULAR; INTRAVENOUS PRN
Status: DISCONTINUED | OUTPATIENT
Start: 2019-05-07 | End: 2019-05-07

## 2019-05-07 RX ORDER — LIDOCAINE 40 MG/G
CREAM TOPICAL
Status: DISCONTINUED | OUTPATIENT
Start: 2019-05-07 | End: 2019-05-07 | Stop reason: HOSPADM

## 2019-05-07 RX ORDER — PROPOFOL 10 MG/ML
INJECTION, EMULSION INTRAVENOUS CONTINUOUS PRN
Status: DISCONTINUED | OUTPATIENT
Start: 2019-05-07 | End: 2019-05-07

## 2019-05-07 RX ADMIN — PROPOFOL 200 MCG/KG/MIN: 10 INJECTION, EMULSION INTRAVENOUS at 15:00

## 2019-05-07 RX ADMIN — LIDOCAINE HYDROCHLORIDE 80 MG: 20 INJECTION, SOLUTION INFILTRATION; PERINEURAL at 14:59

## 2019-05-07 RX ADMIN — LIDOCAINE HYDROCHLORIDE 20 MG: 20 INJECTION, SOLUTION INFILTRATION; PERINEURAL at 15:56

## 2019-05-07 RX ADMIN — MIDAZOLAM 2 MG: 1 INJECTION INTRAMUSCULAR; INTRAVENOUS at 14:54

## 2019-05-07 RX ADMIN — KETOROLAC TROMETHAMINE 30 MG: 30 INJECTION, SOLUTION INTRAMUSCULAR at 15:57

## 2019-05-07 RX ADMIN — SODIUM CHLORIDE: 9 INJECTION, SOLUTION INTRAVENOUS at 12:12

## 2019-05-07 RX ADMIN — PHENYLEPHRINE HYDROCHLORIDE 200 MCG: 10 INJECTION, SOLUTION INTRAMUSCULAR; INTRAVENOUS; SUBCUTANEOUS at 15:41

## 2019-05-07 RX ADMIN — CEFTRIAXONE SODIUM 1 G: 1 INJECTION, SOLUTION INTRAVENOUS at 14:57

## 2019-05-07 RX ADMIN — PHENYLEPHRINE HYDROCHLORIDE 200 MCG: 10 INJECTION, SOLUTION INTRAMUSCULAR; INTRAVENOUS; SUBCUTANEOUS at 16:31

## 2019-05-07 RX ADMIN — PHENYLEPHRINE HYDROCHLORIDE 100 MCG: 10 INJECTION, SOLUTION INTRAMUSCULAR; INTRAVENOUS; SUBCUTANEOUS at 16:16

## 2019-05-07 RX ADMIN — PHENYLEPHRINE HYDROCHLORIDE 100 MCG: 10 INJECTION, SOLUTION INTRAMUSCULAR; INTRAVENOUS; SUBCUTANEOUS at 16:15

## 2019-05-07 RX ADMIN — PHENYLEPHRINE HYDROCHLORIDE 200 MCG: 10 INJECTION, SOLUTION INTRAMUSCULAR; INTRAVENOUS; SUBCUTANEOUS at 15:29

## 2019-05-07 RX ADMIN — PHENYLEPHRINE HYDROCHLORIDE 200 MCG: 10 INJECTION, SOLUTION INTRAMUSCULAR; INTRAVENOUS; SUBCUTANEOUS at 16:26

## 2019-05-07 RX ADMIN — PROPOFOL 100 MG: 10 INJECTION, EMULSION INTRAVENOUS at 15:51

## 2019-05-07 RX ADMIN — DEXAMETHASONE SODIUM PHOSPHATE 8 MG: 4 INJECTION, SOLUTION INTRA-ARTICULAR; INTRALESIONAL; INTRAMUSCULAR; INTRAVENOUS; SOFT TISSUE at 15:09

## 2019-05-07 RX ADMIN — ONDANSETRON 4 MG: 2 INJECTION INTRAMUSCULAR; INTRAVENOUS at 14:59

## 2019-05-07 RX ADMIN — FENTANYL CITRATE 50 MCG: 50 INJECTION, SOLUTION INTRAMUSCULAR; INTRAVENOUS at 15:50

## 2019-05-07 RX ADMIN — ACETAMINOPHEN 1000 MG: 10 INJECTION, SOLUTION INTRAVENOUS at 15:14

## 2019-05-07 RX ADMIN — FENTANYL CITRATE 25 MCG: 50 INJECTION, SOLUTION INTRAMUSCULAR; INTRAVENOUS at 15:07

## 2019-05-07 RX ADMIN — SODIUM CHLORIDE, POTASSIUM CHLORIDE, SODIUM LACTATE AND CALCIUM CHLORIDE: 600; 310; 30; 20 INJECTION, SOLUTION INTRAVENOUS at 16:28

## 2019-05-07 RX ADMIN — PROPOFOL 200 MG: 10 INJECTION, EMULSION INTRAVENOUS at 14:59

## 2019-05-07 RX ADMIN — Medication 30 MG: at 15:04

## 2019-05-07 ASSESSMENT — LIFESTYLE VARIABLES: TOBACCO_USE: 1

## 2019-05-07 NOTE — ANESTHESIA POSTPROCEDURE EVALUATION
Patient: David Calderón    Procedure(s):  Bilateral URETEROSCOPY, with HOLMIUM LASER LITHOTRIPSY and Stent Placement on left    Diagnosis:kidney/ureteral stone  Diagnosis Additional Information: No value filed.    Anesthesia Type:  General, LMA    Note:  Anesthesia Post Evaluation    Patient location during evaluation: PACU  Patient participation: Able to fully participate in evaluation  Level of consciousness: awake and alert  Pain management: adequate  Airway patency: patent  Cardiovascular status: acceptable  Respiratory status: acceptable  Hydration status: acceptable  PONV: none     Anesthetic complications: None          Last vitals:  Vitals:    05/07/19 1650 05/07/19 1655 05/07/19 1700   BP: 127/82 131/85 109/76   Pulse: 76 80 76   Resp:      Temp:  97.8  F (36.6  C)    SpO2: 96% 96% 97%         Electronically Signed By: THERESA ELIZONDO CRNA  May 7, 2019  5:03 PM

## 2019-05-07 NOTE — ANESTHESIA PREPROCEDURE EVALUATION
Anesthesia Pre-Procedure Evaluation    Patient: David Calderón   MRN: [id number] : 1980          Preoperative Diagnosis: kidney/Ureteral stone    Procedure(s):  Right Ureteroscopy with Holmium Laser Lithotripsy & Stent Placement    No past medical history on file.  Past Surgical History:   Procedure Laterality Date     COMBINED CYSTOSCOPY, URETEROSCOPY, LASER HOLMIUM LITHOTRIPSY URETER(S) Bilateral 2019    Procedure: Bilateral Ureteroscopy with Holmium Laser Lithotripsy & Stent Placement;  Surgeon: Pablo Tejada MD;  Location:  OR       Anesthesia Evaluation     . Pt has had prior anesthetic.     No history of anesthetic complications          ROS/MED HX    ENT/Pulmonary:     (+)tobacco use, Current use 1 packs/day  , . .    Neurologic:  - neg neurologic ROS     Cardiovascular:     (+) hypertension----. : . . . :. .       METS/Exercise Tolerance:  >4 METS   Hematologic:  - neg hematologic  ROS       Musculoskeletal:  - neg musculoskeletal ROS       GI/Hepatic:  - neg GI/hepatic ROS       Renal/Genitourinary:  - ROS Renal section negative       Endo:  - neg endo ROS       Psychiatric:  - neg psychiatric ROS       Infectious Disease:  - neg infectious disease ROS       Malignancy:      - no malignancy   Other:    (+) No chance of pregnancy C-spine cleared: N/A, no H/O Chronic Pain,no other significant disability                           Physical Exam  Normal systems: cardiovascular and pulmonary    Airway   Mallampati: II  TM distance: >3 FB  Neck ROM: full  Comment: Full beard    Dental   (+) upper dentures and lower dentures    Cardiovascular       Pulmonary             No results found for: WBC, HGB, HCT, PLT, CRP, SED, NA, POTASSIUM, CHLORIDE, CO2, BUN, CR, GLC, JEFFREY, PHOS, MAG, ALBUMIN, PROTTOTAL, ALT, AST, GGT, ALKPHOS, BILITOTAL, BILIDIRECT, LIPASE, AMYLASE, SIENNA, PTT, INR, FIBR, TSH, T4, T3, HCG, HCGS, CKTOTAL, CKMB, TROPN    Preop Vitals  BP Readings from Last 3 Encounters:   19 118/77  "  04/30/19 119/87   04/24/19 122/64    Pulse Readings from Last 3 Encounters:   05/07/19 66   04/30/19 62   04/24/19 68      Resp Readings from Last 3 Encounters:   05/07/19 16   04/30/19 12   04/24/19 16    SpO2 Readings from Last 3 Encounters:   05/07/19 96%   04/30/19 96%   04/24/19 95%      Temp Readings from Last 1 Encounters:   05/07/19 98.3  F (36.8  C) (Tympanic)    Ht Readings from Last 1 Encounters:   04/24/19 1.88 m (6' 2\")      Wt Readings from Last 1 Encounters:   05/07/19 111.1 kg (245 lb)    Estimated body mass index is 31.46 kg/m  as calculated from the following:    Height as of 4/24/19: 1.88 m (6' 2\").    Weight as of an earlier encounter on 5/7/19: 111.1 kg (245 lb).       Anesthesia Plan      History & Physical Review      ASA Status:  2 .    NPO Status:  > 8 hours    Plan for General and LMA with Intravenous induction. Maintenance will be Balanced.    PONV prophylaxis:  Ondansetron (or other 5HT-3) and Dexamethasone or Solumedrol       Postoperative Care  Postoperative pain management:  IV analgesics.      Consents  Anesthetic plan, risks, benefits and alternatives discussed with:  Patient.  Use of blood products discussed: No .   .                   THERESA ELIZONDO CRNA  "

## 2019-05-07 NOTE — OR NURSING
Beka been discharged to home at 1605 via ambulatory refussed W/C accompanied by Rn and friend Brain    Written discharge instructions were provided to both.  Prescriptions were filled and sent home.      Patient and adult caring for them verbalize understanding of discharge instructions including no driving until tomorrow and no longer taking narcotic pain medications - no operating mechanical equipment and no making any important decisions.They understand reason for discharge, and necessary follow-up appointments.

## 2019-05-07 NOTE — DISCHARGE INSTRUCTIONS
[facility]  Adult Discharge Orders & Instructions      For 24 hours after surgery:  1. Get plenty of rest.  A responsible adult must stay with you for at least 24 hours after you leave the hospital.   2. You may feel lightheaded.  IF so, sit for a few minutes before standing.  Have someone help you get up.   3. You may have a slight fever. Call the doctor if your fever is over 101 F (38.3 C) (taken under the tongue) or lasts longer than 24 hours.  4. You may have a dry mouth, a sore throat, muscle aches or trouble sleeping.  These should go away after 24 hours.  5. Do not make important or legal decisions.  6.   Do not drive or use heavy equipment.  If you have weakness or tingling, don't drive or use heavy equipment until this feeling goes away.                                                                                                                                                                         To contact a doctor, call    [phone]______________

## 2019-05-07 NOTE — OR NURSING
"PACU Transfer Note    David Calderón was transferred to [facility] via cart.  Equipment used for transport:  None.  Accompanied by:  Tyesha Maguire RN and Caren Larson RN.  Prescriptions were: Given to friend by Dr. Tejada and faxed to [facility].    PACU Respiratory Event Documentation     1) Episodes of Apnea greater than or equal to 10 seconds: No    2) Bradypnea - less than 8 breaths per minute: No    3) Pain score on 0 to 10 scale: \"not bad\"    4) Pain-sedation mismatch (yes or no): No    5) Repeated 02 desaturation less than 90% (yes or no): No    Anesthesia notified? (yes or no): No    Any of the above events occuring repeatedly in separate 30 minute intervals may be considered recurrent PACU respiratory events.    Patient stable and meets phase 1 discharge criteria for transport from PACU.    "

## 2019-05-07 NOTE — OP NOTE
Preoperative diagnosis  Bilateral ureteral and kidney stones    Postoperative diagnosis  Bilateral ureteral and kidney stones    Procedure performed  Bilateral ureteroscopy with holmium-YAG laser lithotripsy and basket extraction of stone fragments  Cystoscopy with bilateral retrograde pyelogram and ureteral stent placement  Interpretation of retrograde    Surgeon  Pablo Tejada MD    Surgeon(s)/Proceduralist(s) and Assistants (if any)  Surgeon(s):  Pablo Tejada MD  Circulator: Sara Askew RN; Miryam Ivy RN  : Eloina Germain Circulator: Julisa Rico RN  Scrub Person: Marianna Brown  X-Ray Technologist: Kellie Gerardo Assistant: Viridiana Hunt RN  Pre-Op Nurse: Eloina Iglesias RN    Specimen(s)  Yes  Stone fragments for biochemical analysis    (EBL) Estimated blood loss (ml)  0    Anesthesia  General    Complications  None    Findings  Cystoscopy revealed no tumors, stones or other mucosal abnormalities.  Right retrograde pyelogram revealed a delicate system with identification of the stones seen on imaging.    Left retrograde pyelogram revealed a delicate system with identification of the stones seen on imaging.    Indications  39 year old male agreed to undergo the above named procedure after discussion of the alternatives, risks and benefits.    Informed consent was obtained.      Procedure  The patient was taken to the operating room and placed supine on the operating table.  Pre-operative antibiotics were administered.  Bilateral lower extremity SCDs were placed.  After induction of general anesthesia the patient was positioned in dorsal lithotomy, prepped and draped in a sterile fashion.  A time-out was performed.      I passed a 14 Bangladeshi flexible cystoscope carefully via urethra into the bladder.  The right ureteral orifice was identified and a Sensor wire was passed retrograde to the level of the kidney and confirmed by fluoroscopy.  The flexible  scope was off-loaded and the bladder emptied with a straight catheter.  An 8-10 coaxial dilator was passed without difficulty and then removed.  The MR6A semirigid ureteroscope was passed carefully along the Sensor wire through the urethra and into the distal ureter.  The stone was encountered and fragmented with a 200-micron holmium YAG laser fiber at laser settings of 0.8 joules and a frequency of 8 Hz.  The fragments were basket extracted.  At the completion of the procedure, all clinically significant fragments were removed from the ureter and only dust-like fragments remained.  I performed a retrograde pyelogram through the semirigid scope then removed the scope over a Superstff wire which was passed to the level of the kidney confirmed by fluoroscopy.  The ureteroscope was withdrawn.      I passed a 14 Montserratian flexible cystoscope carefully via urethra into the bladder.  The left ureteral orifice was identified and a Sensor wire was passed retrograde to the level of the kidney and confirmed by fluoroscopy.  The flexible scope was off-loaded and the bladder emptied with a straight catheter.  An 8-10 coaxial dilator was passed without difficulty and then removed.  The MR6A semirigid ureteroscope was passed carefully along the Sensor wire through the urethra and into the distal ureter.  The stone was encountered and fragmented with a 200-micron holmium YAG laser fiber at laser settings of 0.8 joules and a frequency of 8 Hz.  The fragments were basket extracted.  At the completion of the procedure, all clinically significant fragments were removed from the ureter and only dust-like fragments remained.  I performed a retrograde pyelogram through the semirigid scope then removed the scope over a Superstff wire which was passed to the level of the kidney confirmed by fluoroscopy.  The ureteroscope was withdrawn.      A 12-14, 46-cm access sheath was advanced over the super-stiff wire to level of the UPJ under direct  fluoroscopic guidance. The inner stylet and super-stiff wire were removed.  The kidney was entered with the Olympus URF-P6 flexible ureteroscope.  The kidney stone was identified and fragmented with a 200-micron holmium YAG laser fiber at laser settings of 0.8 joules and a frequency of 8 Hz. The fragments were basket extracted. At the completion of the procedure, all clinically significant fragments were removed and only dust-like fragments remained.  The access sheath was removed under direct vision.  Ureteral edema but no obvious obstruction was present.  A 5-Algerian catheter was passed over the safety wire and the wire withdrawn.   Contrast was injected into the renal pelvis via the 5-Algerian open-ended catheter.  A super-stiff wire was placed and confirmed by fluoroscopy.  A 6 x 30 Algerian stent was positioned with the upper end in the upper pole and the lower in the bladder confirmed by fluoroscopy. The bladder was emptied and the procedure was complete. The patient tolerated the procedure well and was stable throughout    Plan  Follow up in clinic for stent pull in the next week or so.  I will plan to acquire a 3 month post-op ultrasound

## 2019-05-15 ENCOUNTER — OFFICE VISIT (OUTPATIENT)
Dept: UROLOGY | Facility: OTHER | Age: 39
End: 2019-05-15
Attending: UROLOGY

## 2019-05-15 VITALS — BODY MASS INDEX: 31.87 KG/M2 | RESPIRATION RATE: 12 BRPM | HEART RATE: 68 BPM | WEIGHT: 248.2 LBS

## 2019-05-15 DIAGNOSIS — N20.0 KIDNEY STONE: Primary | ICD-10-CM

## 2019-05-15 PROCEDURE — 52310 CYSTOSCOPY AND TREATMENT: CPT | Performed by: UROLOGY

## 2019-05-15 PROCEDURE — 99212 OFFICE O/P EST SF 10 MIN: CPT | Mod: 25 | Performed by: UROLOGY

## 2019-05-15 ASSESSMENT — PAIN SCALES - GENERAL: PAINLEVEL: NO PAIN (0)

## 2019-05-15 NOTE — NURSING NOTE
Patient positioned in supine position, perineum area prepped with chlorhexidene Gluconate and patient draped per sterile technique. Per verbal order read back by Pablo Tejada MD, Urojet 10mL 2% lidocaine jelly to be instilled into urethra.  Urojet- 10ml 2% Lidocaine jelly instilled into the urethra.    Urojet 2%  Lot#: [id number]  Expiration date: 12/20  : [employer]  NDC: [address]-0    [facility] Protocol    A. Pre-procedure verification complete Yes  1-relevant information / documentation available, reviewed and properly matched to the patient; 2-consent accurate and complete, 3-equipment and supplies available    B. Site marking complete N/A  Site marked if not in continuous attendance with patient    C. TIME OUT completed Yes  Time Out was conducted just prior to starting procedure to verify the eight required elements: 1-patient identity, 2-consent accurate and complete, 3-position, 4-correct side/site marked (if applicable), 5-procedure, 6-relevant images / results properly labeled and displayed (if applicable), 7-antibiotics / irrigation fluids (if applicable), 8-safety precautions.    After procedure perineum area rinsed. Discharge instructions reviewed with patient. Patient verbalized understanding of discharge instructions and discharged ambulatory.  Akiko Germain..................5/15/2019  12:18 PM

## 2019-05-15 NOTE — PATIENT INSTRUCTIONS
Home Care after Cystoscopy  Follow these guidelines for your care after your procedure.    Activity  No limitations    Bathing or showering  No limitations    Symptoms  You may notice some burning with urination but this usually resolves after 1-2 days.  You may also notice small amounts of blood in your urine.  Please increase water intake for the next few days to help with these symptoms.    Contacts  General Questions: [phone]  Appointments:  [phone]  Emergencies:  [phone]    When to call the clinic  If you develop any of the following symptoms please call the clinic immediately.  If the clinic is closed please be seen at an urgent care clinic or the Emergency Department.  - Burning with urination that worsens after 2 days  - Unable to urinate causing severe pelvic pain  - Fevers of greater than 101 degrees F  - Flank pain that is not responding to pain medication    Follow up  Please follow up as discussed at the appointment.        Please follow the below generic recommendations to help prevent stones.    If you are interested in undergoing a work up (including blood and urine tests) to determine your patient specific factors for why you form stones and ways to specifically prevent stones in the future, ask Dr Tejada if he hasn't already discussed this with you.      Fluids (Increase)  Please increase your fluid intake   Recommend about ten 10-ounce glasses of fluid per day (avoid dark ashley).  Please try and keep your urine clear or pale yellow.    If it is dark yellow or cloudy it is concentrated and you need to drink more fluid.  Try drinking a tall glass of water prior to every snack/meal.    Citrate (Increase)  Citrate prevents stone formation and is naturally found in urine.    It can be increased by adding concentrated lemon juice (4 ounces daily) and/or drinking diluted orange juice (50/50 with water).    Both MinuteMaid Light and Crystal Light also contain citrate and can be helpful for stone  prevention.    If you plan to drink sodas, I would recommend Diet/Sunkist and/or Diet/7UP as they contain the most citrate (which is good) compared to the ashley such as Coke/Pepsi.      Salt (Decrease)  Try to limit salt intake.  Total daily sodium intake should be less than 1500mg.  If you use salt with cooking don't add any additional salt at the table.    Protein  Limit protein intake to small to moderate portions.  For instance, a steak should be no larger than about the size of a deck of cards.  High protein intake leads to stone formation.

## 2019-05-15 NOTE — PROGRESS NOTES
Preoperative diagnosis  Nephrolithiasis    Postoperative diagnosis  Nephrolithiasis    Procedure  Flexible cystourethroscopy with stent removal    Surgeon  Pablo Tejada MD    Anesthesia  2% lidocaine jelly intraurethrally    Complications  None    Indications  39 year old male who is status post ureteroscopy with holmium laser lithotripsy who presents for stent removal.    Procedure  The patient was given one dose of antibiotics. The patient was placed in supine position and was prepped and draped in sterile fashion.  2% lidocaine jelly was bluntly injected per urethra without difficulty. I passed the 14 Bermudian flexible cystoscope through the urethra and into the bladder.  With the aid of a stent grasper I grasped and removed the stent in its entirety.  The patient tolerated the procedure well.    Plan  Discussed generic dietary approach to stone prevention- provided hand out  The patient will proceed with 24 urine collection, Litholink, and follow-up in 3 months with a renal ultrasound prior        I spent 10 minutes on this patient's visit (exclusive of separately billed services/procedures) and over half of this time was spent in face-to-face counseling regarding stone prevention:  Prevention strategies with fluids and diet, rationale for a metabolic work up, prognosis and importance of compliance.

## 2019-05-15 NOTE — NURSING NOTE
Cefuroxime 500mg tablet (2-250mg tablets with same lot # and expiration date) by mouth one time now ordered by Pablo Tejada MD.  Medication administered per verbal order   Lot # [id number]  Exp. 11/30/2019  Patient tolerated well.  Akiko Germain RN......May 15, 2019...12:01 PM

## 2019-05-20 ENCOUNTER — TELEPHONE (OUTPATIENT)
Dept: UROLOGY | Facility: OTHER | Age: 39
End: 2019-05-20

## 2019-05-20 DIAGNOSIS — N20.0 KIDNEY STONE: Primary | ICD-10-CM

## 2021-07-06 ENCOUNTER — APPOINTMENT (OUTPATIENT)
Dept: OCCUPATIONAL MEDICINE | Facility: OTHER | Age: 41
End: 2021-07-06

## 2021-07-06 ENCOUNTER — APPOINTMENT (OUTPATIENT)
Dept: CHIROPRACTIC MEDICINE | Facility: OTHER | Age: 41
End: 2021-07-06

## 2021-07-06 PROCEDURE — 99499 UNLISTED E&M SERVICE: CPT

## 2025-05-06 NOTE — ANESTHESIA CARE TRANSFER NOTE
Patient: David Calderón    Procedure(s):  Bilateral URETEROSCOPY, with HOLMIUM LASER LITHOTRIPSY and Stent Placement on left    Diagnosis: kidney/ureteral stone  Diagnosis Additional Information: No value filed.    Anesthesia Type:   General, LMA     Note:  Airway :LMA and T-piece  Patient transferred to:PACU  Handoff Report: Identifed the Patient, Identified the Reponsible Provider, Reviewed the pertinent medical history, Discussed the surgical course, Reviewed Intra-OP anesthesia mangement and issues during anesthesia, Set expectations for post-procedure period and Allowed opportunity for questions and acknowledgement of understanding      Vitals: (Last set prior to Anesthesia Care Transfer)    CRNA VITALS  5/7/2019 1609 - 5/7/2019 1641      5/7/2019             Resp Rate (observed):  2  (Abnormal)     Resp Rate (set):  10                Electronically Signed By: THERESA ELIZONDO CRNA  May 7, 2019  4:41 PM  
not tested

## (undated) DEVICE — PAD CHUX UNDERPAD 30X36" P3036C

## (undated) DEVICE — GLOVE PROTEXIS POWDER FREE SMT 8.5 2D72PT85X

## (undated) DEVICE — CATH COUDE 18FR TIEMANN LATEX 120618

## (undated) DEVICE — TUOGHY BORST ADAPTER WITH SIDE ARM

## (undated) DEVICE — PACK CYSTO SBA15CSFCA

## (undated) DEVICE — SHEATH URETERAL ACCESS NAVIGATOR HD 12/14FRX36CM M0062502250

## (undated) DEVICE — LASER FIBER HOLMIUM FLEXIVA TRACTIP 200UM M0068403960

## (undated) DEVICE — BASKET NITINOL TIPLESS HALO  1.5FRX120CM 554120

## (undated) DEVICE — SHEATH URETERAL ACCESS NAVIGATOR HD 12/14FRX46CM M0062502260

## (undated) DEVICE — SLEEVE COMPRESSION SCD KNEE MED 74022

## (undated) DEVICE — CATH INTERMITTENT CLEAN-CATH 16FR 16" VINYL LF 421716

## (undated) DEVICE — SOL WATER 1500ML

## (undated) DEVICE — CATH URETERAL 5FRX70CM OPEN END FLEX TIP G14521

## (undated) DEVICE — GUIDEWIRE AMPLATZ SUPER STIFF .038"X145CM M0066401061

## (undated) DEVICE — Device

## (undated) DEVICE — GUIDEWIRE SENSOR DUAL FLEX STR 0.035"X150CM M0066703080

## (undated) RX ORDER — ACETAMINOPHEN 10 MG/ML
INJECTION, SOLUTION INTRAVENOUS
Status: DISPENSED
Start: 2019-04-30

## (undated) RX ORDER — PROPOFOL 10 MG/ML
INJECTION, EMULSION INTRAVENOUS
Status: DISPENSED
Start: 2019-05-07

## (undated) RX ORDER — KETOROLAC TROMETHAMINE 30 MG/ML
INJECTION, SOLUTION INTRAMUSCULAR; INTRAVENOUS
Status: DISPENSED
Start: 2019-05-07

## (undated) RX ORDER — CEFUROXIME AXETIL 250 MG/1
TABLET ORAL
Status: DISPENSED
Start: 2019-05-15

## (undated) RX ORDER — KETOROLAC TROMETHAMINE 30 MG/ML
INJECTION, SOLUTION INTRAMUSCULAR; INTRAVENOUS
Status: DISPENSED
Start: 2019-04-30

## (undated) RX ORDER — PHENYLEPHRINE HCL IN 0.9% NACL 1 MG/10 ML
SYRINGE (ML) INTRAVENOUS
Status: DISPENSED
Start: 2019-05-07

## (undated) RX ORDER — LIDOCAINE HYDROCHLORIDE 10 MG/ML
INJECTION, SOLUTION EPIDURAL; INFILTRATION; INTRACAUDAL; PERINEURAL
Status: DISPENSED
Start: 2019-04-30

## (undated) RX ORDER — KETAMINE HYDROCHLORIDE 50 MG/ML
INJECTION, SOLUTION INTRAMUSCULAR; INTRAVENOUS
Status: DISPENSED
Start: 2019-04-30

## (undated) RX ORDER — PROPOFOL 10 MG/ML
INJECTION, EMULSION INTRAVENOUS
Status: DISPENSED
Start: 2019-04-30

## (undated) RX ORDER — LIDOCAINE HYDROCHLORIDE 20 MG/ML
INJECTION, SOLUTION EPIDURAL; INFILTRATION; INTRACAUDAL; PERINEURAL
Status: DISPENSED
Start: 2019-04-30

## (undated) RX ORDER — ONDANSETRON 2 MG/ML
INJECTION INTRAMUSCULAR; INTRAVENOUS
Status: DISPENSED
Start: 2019-05-07

## (undated) RX ORDER — KETAMINE HYDROCHLORIDE 50 MG/ML
INJECTION, SOLUTION INTRAMUSCULAR; INTRAVENOUS
Status: DISPENSED
Start: 2019-05-07

## (undated) RX ORDER — FENTANYL CITRATE 50 UG/ML
INJECTION, SOLUTION INTRAMUSCULAR; INTRAVENOUS
Status: DISPENSED
Start: 2019-05-07

## (undated) RX ORDER — ONDANSETRON 2 MG/ML
INJECTION INTRAMUSCULAR; INTRAVENOUS
Status: DISPENSED
Start: 2019-04-30

## (undated) RX ORDER — DEXAMETHASONE SODIUM PHOSPHATE 4 MG/ML
INJECTION, SOLUTION INTRA-ARTICULAR; INTRALESIONAL; INTRAMUSCULAR; INTRAVENOUS; SOFT TISSUE
Status: DISPENSED
Start: 2019-05-07

## (undated) RX ORDER — CEFTRIAXONE SODIUM 1 G/50ML
INJECTION, SOLUTION INTRAVENOUS
Status: DISPENSED
Start: 2019-05-07

## (undated) RX ORDER — ACETAMINOPHEN 10 MG/ML
INJECTION, SOLUTION INTRAVENOUS
Status: DISPENSED
Start: 2019-05-07

## (undated) RX ORDER — SODIUM CHLORIDE, SODIUM LACTATE, POTASSIUM CHLORIDE, CALCIUM CHLORIDE 600; 310; 30; 20 MG/100ML; MG/100ML; MG/100ML; MG/100ML
INJECTION, SOLUTION INTRAVENOUS
Status: DISPENSED
Start: 2019-05-07

## (undated) RX ORDER — FENTANYL CITRATE 50 UG/ML
INJECTION, SOLUTION INTRAMUSCULAR; INTRAVENOUS
Status: DISPENSED
Start: 2019-04-30

## (undated) RX ORDER — LIDOCAINE HYDROCHLORIDE 20 MG/ML
INJECTION, SOLUTION EPIDURAL; INFILTRATION; INTRACAUDAL; PERINEURAL
Status: DISPENSED
Start: 2019-05-07

## (undated) RX ORDER — CEFTRIAXONE SODIUM 1 G/50ML
INJECTION, SOLUTION INTRAVENOUS
Status: DISPENSED
Start: 2019-04-30

## (undated) RX ORDER — DEXAMETHASONE SODIUM PHOSPHATE 4 MG/ML
INJECTION, SOLUTION INTRA-ARTICULAR; INTRALESIONAL; INTRAMUSCULAR; INTRAVENOUS; SOFT TISSUE
Status: DISPENSED
Start: 2019-04-30